# Patient Record
Sex: FEMALE | Race: WHITE | NOT HISPANIC OR LATINO | Employment: OTHER | URBAN - METROPOLITAN AREA
[De-identification: names, ages, dates, MRNs, and addresses within clinical notes are randomized per-mention and may not be internally consistent; named-entity substitution may affect disease eponyms.]

---

## 2017-03-13 ENCOUNTER — ALLSCRIPTS OFFICE VISIT (OUTPATIENT)
Dept: OTHER | Facility: OTHER | Age: 72
End: 2017-03-13

## 2017-03-13 DIAGNOSIS — R94.7 ABNORMAL RESULTS OF OTHER ENDOCRINE FUNCTION STUDIES: ICD-10-CM

## 2017-03-13 DIAGNOSIS — R79.89 OTHER SPECIFIED ABNORMAL FINDINGS OF BLOOD CHEMISTRY: ICD-10-CM

## 2017-03-13 DIAGNOSIS — R42 DIZZINESS AND GIDDINESS: ICD-10-CM

## 2017-09-18 ENCOUNTER — ALLSCRIPTS OFFICE VISIT (OUTPATIENT)
Dept: OTHER | Facility: OTHER | Age: 72
End: 2017-09-18

## 2017-09-18 DIAGNOSIS — R73.01 IMPAIRED FASTING GLUCOSE: ICD-10-CM

## 2017-09-18 DIAGNOSIS — R94.6 ABNORMAL RESULTS OF THYROID FUNCTION STUDIES: ICD-10-CM

## 2017-09-19 ENCOUNTER — TRANSCRIBE ORDERS (OUTPATIENT)
Dept: ADMINISTRATIVE | Facility: HOSPITAL | Age: 72
End: 2017-09-19

## 2017-09-19 DIAGNOSIS — E04.2 NONTOXIC MULTINODULAR GOITER: Primary | ICD-10-CM

## 2017-10-05 ENCOUNTER — HOSPITAL ENCOUNTER (OUTPATIENT)
Dept: ULTRASOUND IMAGING | Facility: HOSPITAL | Age: 72
Discharge: HOME | End: 2017-10-05
Admitting: RADIOLOGY
Payer: MEDICARE

## 2017-10-05 DIAGNOSIS — E04.2 NONTOXIC MULTINODULAR GOITER: ICD-10-CM

## 2017-10-05 PROCEDURE — 88172 CYTP DX EVAL FNA 1ST EA SITE: CPT | Performed by: PHYSICIAN ASSISTANT

## 2017-10-05 PROCEDURE — 88173 CYTOPATH EVAL FNA REPORT: CPT | Performed by: PHYSICIAN ASSISTANT

## 2017-10-05 PROCEDURE — 10022 HB FNA W/IMAGE: CPT

## 2017-10-05 PROCEDURE — 76942 ECHO GUIDE FOR BIOPSY: CPT

## 2017-10-05 RX ORDER — LIDOCAINE HYDROCHLORIDE 10 MG/ML
INJECTION, SOLUTION INFILTRATION; PERINEURAL CODE/TRAUMA/SEDATION MEDICATION
Status: COMPLETED | OUTPATIENT
Start: 2017-10-05 | End: 2017-10-05

## 2017-10-05 RX ADMIN — LIDOCAINE HYDROCHLORIDE 2 ML: 10 INJECTION, SOLUTION INFILTRATION; PERINEURAL at 08:30

## 2017-10-10 ENCOUNTER — GENERIC CONVERSION - ENCOUNTER (OUTPATIENT)
Dept: OTHER | Facility: OTHER | Age: 72
End: 2017-10-10

## 2017-12-27 DIAGNOSIS — R94.6 ABNORMAL RESULTS OF THYROID FUNCTION STUDIES: ICD-10-CM

## 2018-01-10 NOTE — MISCELLANEOUS
Provider Comments  Provider Comments:   No show for 7/18 appointment        Signatures   Electronically signed by : Isabel Longo OM; Jul 19 2016 11:37AM EST                       (Author)    Electronically signed by : Kavin Emmanuel [facility]; Jul 19 2016  5:17PM EST                       (Author)    Electronically signed by : ALEX Smith ; Jul 19 2016  5:20PM EST

## 2018-01-10 NOTE — RESULT NOTES
Discussion/Summary   FNA showed no evidence of thyroid cancer  Verified Results  (1) FINE NEEDLE ASPIRATION [id number] 08:41AM Torito Christianson     Test Name Result Flag Reference   LAB AP CASE REPORT (Report)     Non-gynecologic Cytology             Case: [id number]                  Authorizing Provider: Jd Chirinos PA-C  Collected:      10/05/2017 0399        Ordering Location:   [address] [place] [address] Received:      10/05/2017 0845                    Ultrasound                                   Pathologist:      Reyes Sharpe MD                                 Specimens:  A) - Thyroid, Right                                          B) - Thyroid, Right   LAB AP CYTO FINAL DIAGNOSIS (Report)     A - B  Thyroid, right (fine needle aspiration):    - Negative for malignancy (Honor Category II) - See note  - Findings compatible with a benign follicular nodule  Note: As reported in the [address] [place] [address] Road for Reporting Thyroid   Cytopathology*, the diagnostic category of benign/negative for   malignancy carries 0% to 3% risk of malignancy being found in subsequent   resections (in the few studies of patients with benign FNA results that   were followed long-term, a false negative rate of <1% was reported), with   the usual management being clinical follow-up supplemented by   ultrasonography (US) as indicated  Repeat FNA may be indicated for nodules   showing significant growth or developing US abnormalities  Ultimately,   clinical/imaging correlation for this patient is needed in arriving at the   actual management plan    *The [facility] System for Reporting Thyroid Cytopathology, Crisoforo Boos , Myrene Shiley Jerrol Landau ), 2010 (1st ed )  Electronically signed by Reyes Sharpe MD on 10/9/2017 at 11:24 AM   LAB AP NONGYN NOTE (Report)     - The treating physician has requested a sample(s) from the above thyroid   nodule(s) be sent for analysis by  [facility] Gene Expression  ([facility]), performed by Lisha Laughlin   [place], [place])  [employer] only performs this test on specimen(s) receiving a cytologic   diagnosis of Coplay Category III or  IV  If such a diagnosis is rendered (above) specimen will be sent to   [facility], and a separate report with  results of [facility] will follow directly from [employer] (typically taking   14 days)  If a cytologic  interpretation other than Coplay category III or IV is rendered,   specimen will not be sent for [facility]   - Interpretation performed at [facility], [address] [place] [address]  LAB AP INTRAOPERATIVE CONSULTATION      Thyroid, right,  FNAB on-site evaluation: Adequate     - Dr Diana Fitzgerald spoke with ANDREINA Cardenas at 8:40 am on 10/05/2017   LAB AP GROSS DESCRIPTION      A  Thyroid, Right, : 20ml, pink, received in [facility]    B  Thyroid, Right, : 4 slides received, ( 2 diff quik / 2 alcohol fixed )   LAB AP NONGYN ADDITIONAL INFORMATION (Report)     [facility]'s FDA approved ,  and ThinPrep Imaging System are   utilized with strict adherence to the 's instruction manual to   prepare gynecologic and non-gynecologic cytology specimens for the   production of ThinPrep slides as well as for gynecologic ThinPrep imaging  These processes have been validated by our laboratory and/or by the     These tests were developed and their performance characteristics   determined by [facility] or [facility]  They may not be cleared or approved by the [place]  Food and   Drug Administration  The FDA has determined that such clearance or   approval is not necessary  These tests are used for clinical purposes  They should not be regarded as investigational or for research  This   laboratory has been approved by CLIA 88, designated as a high-complexity   laboratory and is qualified to perform these tests  LAB AP CLINICAL INFORMATION      Size: 2 3x1  8x1 8cm Margins: N/A Echogenicity: N/A Microcalcs: N/A Flow: N/A Size change: N/A Suspicion level: N/A Hx of Hashimoto's Thyroiditis: N/A

## 2018-01-13 VITALS
BODY MASS INDEX: 29.03 KG/M2 | HEART RATE: 76 BPM | HEIGHT: 65 IN | WEIGHT: 174.25 LBS | SYSTOLIC BLOOD PRESSURE: 122 MMHG | DIASTOLIC BLOOD PRESSURE: 76 MMHG

## 2018-01-14 VITALS
DIASTOLIC BLOOD PRESSURE: 80 MMHG | HEIGHT: 65 IN | HEART RATE: 70 BPM | WEIGHT: 175 LBS | BODY MASS INDEX: 29.16 KG/M2 | SYSTOLIC BLOOD PRESSURE: 124 MMHG

## 2018-05-03 ENCOUNTER — OFFICE VISIT (OUTPATIENT)
Dept: ENDOCRINOLOGY | Facility: CLINIC | Age: 73
End: 2018-05-03
Payer: MEDICARE

## 2018-05-03 VITALS
WEIGHT: 168.9 LBS | HEART RATE: 66 BPM | HEIGHT: 65 IN | BODY MASS INDEX: 28.14 KG/M2 | SYSTOLIC BLOOD PRESSURE: 120 MMHG | DIASTOLIC BLOOD PRESSURE: 68 MMHG

## 2018-05-03 DIAGNOSIS — E04.2 GOITER, NONTOXIC, MULTINODULAR: Primary | ICD-10-CM

## 2018-05-03 DIAGNOSIS — E55.9 VITAMIN D DEFICIENCY: ICD-10-CM

## 2018-05-03 DIAGNOSIS — R73.01 IMPAIRED FASTING GLUCOSE: ICD-10-CM

## 2018-05-03 DIAGNOSIS — R79.89 ABNORMAL TSH: ICD-10-CM

## 2018-05-03 PROCEDURE — 99213 OFFICE O/P EST LOW 20 MIN: CPT | Performed by: NURSE PRACTITIONER

## 2018-05-03 RX ORDER — OMEPRAZOLE 40 MG/1
CAPSULE, DELAYED RELEASE ORAL
COMMUNITY
Start: 2018-04-19

## 2018-05-03 RX ORDER — AMLODIPINE BESYLATE 5 MG/1
5 TABLET ORAL
COMMUNITY
Start: 2017-12-29

## 2018-05-03 RX ORDER — METOPROLOL TARTRATE 50 MG/1
TABLET, FILM COATED ORAL
COMMUNITY
Start: 2018-04-19

## 2018-05-03 RX ORDER — ASPIRIN 81 MG/1
TABLET, CHEWABLE ORAL
COMMUNITY

## 2018-05-03 RX ORDER — OMEGA-3 FATTY ACIDS/FISH OIL 300-1000MG
CAPSULE ORAL
COMMUNITY
Start: 2010-04-19

## 2018-05-03 RX ORDER — RANITIDINE 300 MG/1
TABLET ORAL
COMMUNITY
Start: 2018-04-19

## 2018-05-03 RX ORDER — FAMOTIDINE 20 MG
TABLET ORAL
COMMUNITY

## 2018-05-03 RX ORDER — MULTIVITAMIN
TABLET ORAL
COMMUNITY

## 2018-05-03 RX ORDER — LORATADINE AND PSEUDOEPHEDRINE 10; 240 MG/1; MG/1
1 TABLET, EXTENDED RELEASE ORAL
COMMUNITY
Start: 2017-05-24

## 2018-05-03 RX ORDER — FLUTICASONE PROPIONATE 110 UG/1
AEROSOL, METERED RESPIRATORY (INHALATION)
COMMUNITY
Start: 2016-07-21

## 2018-05-03 NOTE — PROGRESS NOTES
Established Patient Progress Note       Chief Complaint   Patient presents with    Thyroid Problem        History of Present Illness:     Salvador Pradhan is a 67 y o  female with a history of thyroid nodules, elevated TSH, impaired fasting glucose, and vitamin d deficiency  For the thyroid nodules her most recent US from August 2017 showed her dominant right thyroid nodule had increased in size and had subsequent FNA which was negative for thyroid cancer  She denies neck pain, dysphagia, dysphonia, or dyspnea  Her most recent TSH was 3 89 and T4 was 0 98  For the impaired fasting glucose her most recent glucose level was 102 and A1C was 4 9  She is currently with diet and exercise  For the vitamin d deficiency she is currently taking 1,000 units daily  Patient Active Problem List   Diagnosis    Abnormal TSH    Goiter, nontoxic, multinodular    Impaired fasting glucose    Vitamin D deficiency      History reviewed  No pertinent past medical history  History reviewed  No pertinent surgical history  Family History   Problem Relation Age of Onset    Thyroid disease Mother     Thyroid disease Sister     Thyroid disease Maternal Aunt      Social History   Substance Use Topics    Smoking status: Never Smoker    Smokeless tobacco: Never Used    Alcohol use No     Allergies   Allergen Reactions    Other      Cough, congestion       Current Outpatient Prescriptions:     amLODIPine (NORVASC) 5 mg tablet, Take 5 mg by mouth, Disp: , Rfl:     aspirin 81 mg chewable tablet, Chew, Disp: , Rfl:     fluticasone (FLOVENT HFA) 110 MCG/ACT inhaler, One spray in each nostril twice a day as needed  , Disp: , Rfl:     loratadine-pseudoephedrine (CLARITIN-D 24-HOUR)  mg per 24 hr tablet, Take 1 tablet by mouth, Disp: , Rfl:     metoprolol tartrate (LOPRESSOR) 50 mg tablet, , Disp: , Rfl:     Multiple Vitamin (MULTI-VITAMIN DAILY) TABS, Take by mouth, Disp: , Rfl:     Omega 3 1000 MG CAPS, Take by mouth, Disp: , Rfl:     omeprazole (PriLOSEC) 40 MG capsule, , Disp: , Rfl:     ranitidine (ZANTAC) 300 MG tablet, , Disp: , Rfl:     Vitamin D, Cholecalciferol, 1000 units CAPS, Take by mouth, Disp: , Rfl:     Review of Systems   Constitutional: Negative for chills and fever  HENT: Negative for sore throat and trouble swallowing  Eyes: Negative for visual disturbance  Respiratory: Negative for shortness of breath  Cardiovascular: Negative for chest pain and palpitations  Gastrointestinal: Negative for abdominal pain, constipation and diarrhea  Endocrine: Negative for cold intolerance, heat intolerance, polydipsia, polyphagia and polyuria  Genitourinary: Negative for frequency  Musculoskeletal: Negative for arthralgias and myalgias  Skin: Negative for rash  Neurological: Negative for dizziness and tremors  Hematological: Negative for adenopathy  Psychiatric/Behavioral: Negative for sleep disturbance  All other systems reviewed and are negative  Physical Exam:  Body mass index is 28 11 kg/m²  /68   Pulse 66   Ht 5' 5" (1 651 m)   Wt 76 6 kg (168 lb 14 4 oz)   BMI 28 11 kg/m²    Wt Readings from Last 3 Encounters:   05/03/18 76 6 kg (168 lb 14 4 oz)   09/18/17 79 4 kg (175 lb)   03/13/17 79 kg (174 lb 4 oz)       Physical Exam   Constitutional: She is oriented to person, place, and time  She appears well-developed and well-nourished  No distress  HENT:   Head: Normocephalic and atraumatic  Eyes: Conjunctivae are normal  Pupils are equal, round, and reactive to light  Neck: Normal range of motion  Neck supple  No thyromegaly present  Cardiovascular: Normal rate, regular rhythm and normal heart sounds  Pulmonary/Chest: Effort normal and breath sounds normal  No respiratory distress  She has no wheezes  She has no rales  Abdominal: Soft  Bowel sounds are normal  She exhibits no distension  There is no tenderness  Musculoskeletal: Normal range of motion   She exhibits no edema  Neurological: She is alert and oriented to person, place, and time  Skin: Skin is warm and dry  Psychiatric: She has a normal mood and affect  Vitals reviewed  Labs:     1/09/18    TSH 3 89 and T4 0 98    A1C 4 9 and fasting glucose 102      EXAM  THYROID ULTRASOUND - 8/29/2017 3:40 pm    HISTORY  Thyroid nodules f/u    COMPARISON  US NECK dated 2/23/2017; [address] dated 7/7/2016; [address] dated 1/29/2016    TECHNIQUE  Real time sonographic imaging of the thyroid was performed  FINDINGS  Right lobe measures 3 8 x 2 0 x 2 2 cm, isthmus measures 0 2 cm, and left lobe measures 3 8 x 0 7 x 0 8 cm     Thyroid gland is small   It is heterogenous   The solid dominant isoechoic nodule within the right thyroid   measures 2 3 x 1 8 x 2 0 cm   A subcentimeter nodule is noted more inferiorly   On the study of 01/05/2015   this measures 2 0 x 1 4 x 1 6 cm   It is significantly larger  IMPRESSION  Dominant nodule within the right lobe has gradually increased in size   FNA is recommended  Case Report   Non-gynecologic Cytology                          Case: [id number]                                   Authorizing Provider: Esme Calabrese PA-C    Collected:           10/05/2017 0841               Ordering Location:     [facility] Received:            10/05/2017 0845                                      Ultrasound                                                                    Pathologist:           Edgard Alicea MD                                                                 Specimens:   A) - Thyroid, Right                                                                                  B) - Thyroid, Right                                                                        Final Diagnosis   A - B  Thyroid, right (fine needle aspiration):     - Negative for malignancy (Hackberry Category II) - See note       - Findings compatible with a benign follicular nodule      Note: As reported in the [facility] for Reporting Thyroid Cytopathology*, the diagnostic category of "benign/negative for malignancy" carries 0% to 3% risk of malignancy being found in subsequent resections (in the few studies of patients with benign FNA results that were followed long-term, a false negative rate of <1% was reported), with the usual management being clinical follow-up supplemented by ultrasonography (US) as indicated  Repeat FNA may be indicated for nodules showing significant growth or developing US abnormalities  Ultimately, clinical/imaging correlation for this patient is needed in arriving at the actual management plan  *The [place] System for Reporting Thyroid Cytopathology, Elizabeth Carrasco ), 2010 (1st ed )             Impression & Plan:    Problem List Items Addressed This Visit     Abnormal TSH     TSH and T4 within normal range, check prior to next visit          Relevant Orders    T4, free    TSH, 3rd generation    Goiter, nontoxic, multinodular - Primary     FNA was negative for malignancy, check US and labs prior to next visit          Relevant Medications    metoprolol tartrate (LOPRESSOR) 50 mg tablet    Other Relevant Orders    US thyroid    Impaired fasting glucose     Fasting glucose slightly elevated, check A1C and fasting glucose prior to next visit          Relevant Orders    HEMOGLOBIN A1C W/ EAG ESTIMATION    Glucose, fasting Lab Collect    Vitamin D deficiency     Continue vitamin d supplementation, check vitamin d level          Relevant Orders    Vitamin D 25 hydroxy          Orders Placed This Encounter   Procedures    US thyroid     Standing Status:   Future     Standing Expiration Date:   5/3/2019     Scheduling Instructions:      No prep required  Please bring your physician order, insurance cards, a form of photo ID and a list of your medications with you  Arrive 15 minutes prior to your appointment time in order to register  Order Specific Question:   Reason for Exam:     Answer:   thyroid nodules    T4, free     Standing Status:   Future     Standing Expiration Date:   5/3/2019    TSH, 3rd generation     This is a patient instruction: This test is non-fasting  Please drink two glasses of water morning of bloodwork  Standing Status:   Future     Standing Expiration Date:   5/3/2019    HEMOGLOBIN A1C W/ EAG ESTIMATION     Standing Status:   Future     Standing Expiration Date:   5/3/2019    Glucose, fasting Lab Collect     This is a patient instruction: This test requires patient fasting for 8 hours or longer  Standing Status:   Future     Standing Expiration Date:   5/3/2019    Vitamin D 25 hydroxy     Standing Status:   Future     Standing Expiration Date:   5/3/2019         Discussed with the patient and all questioned fully answered  She will call me if any problems arise  Follow-up appointment in 6 months       Counseled patient on diagnostic results, prognosis, risk and benefit of treatment options, instruction for management, importance of treatment compliance, Risk  factor reduction and impressions      Roel Awad [address]

## 2023-01-19 ENCOUNTER — APPOINTMENT (RX ONLY)
Dept: URBAN - METROPOLITAN AREA CLINIC 134 | Facility: CLINIC | Age: 78
Setting detail: DERMATOLOGY
End: 2023-01-19

## 2023-01-19 DIAGNOSIS — L40.0 PSORIASIS VULGARIS: ICD-10-CM

## 2023-01-19 DIAGNOSIS — L82.1 OTHER SEBORRHEIC KERATOSIS: ICD-10-CM

## 2023-01-19 PROCEDURE — ? COUNSELING

## 2023-01-19 PROCEDURE — ? OTHER

## 2023-01-19 PROCEDURE — 99203 OFFICE O/P NEW LOW 30 MIN: CPT

## 2023-01-19 ASSESSMENT — LOCATION DETAILED DESCRIPTION DERM
LOCATION DETAILED: RIGHT SUPRAPUBIC SKIN
LOCATION DETAILED: RIGHT POSTERIOR NECK

## 2023-01-19 ASSESSMENT — LOCATION ZONE DERM
LOCATION ZONE: TRUNK
LOCATION ZONE: NECK

## 2023-01-19 ASSESSMENT — LOCATION SIMPLE DESCRIPTION DERM
LOCATION SIMPLE: GROIN
LOCATION SIMPLE: POSTERIOR NECK

## 2023-01-19 NOTE — PROCEDURE: OTHER
Note Text (......Xxx Chief Complaint.): This diagnosis correlates with the
Other (Free Text): Patient may call in for Beta D ointment if needed
Detail Level: Detailed
Render Risk Assessment In Note?: no